# Patient Record
(demographics unavailable — no encounter records)

---

## 2025-01-03 NOTE — HISTORY OF PRESENT ILLNESS
[FreeTextEntry1] : Patient presents today for evaluation. He has cystic fibrosis, newly diagnosed. He presents today for evaluation of onychomycosis of his hallux nail.

## 2025-01-03 NOTE — PHYSICAL EXAM
[2+] : left foot dorsalis pedis 2+ [Sensation] : the sensory exam was normal to light touch and pinprick [No Focal Deficits] : no focal deficits [Deep Tendon Reflexes (DTR)] : deep tendon reflexes were 2+ and symmetric [Motor Exam] : the motor exam was normal [Ankle Swelling (On Exam)] : not present [Varicose Veins Of Lower Extremities] : not present [] : not present [Delayed in the Right Toes] : capillary refills normal in right toes [Delayed in the Left Toes] : capillary refills normal in the left toes [FreeTextEntry3] : Hair growth noted on digits. Proximal to distal cooling is within normal limits.  [FreeTextEntry1] : Onychomycotic left hallux nail. There is slight improvement with the topical antifungal but not nearly enough after 4 months.

## 2025-01-03 NOTE — ASSESSMENT
[FreeTextEntry1] : Impression:  Onychomycosis left hallux. Pain.  Treatment: He is potentially going to be starting new medicines, so I want to be cautious with him. I started him on Terbinafine. I gave him a week's worth, 250mg once a day for the next 7 days. I debrided the mycotic nail and I ePrescribed Ciclopirox for him to use daily. We will see how he does over time.